# Patient Record
Sex: MALE | Race: BLACK OR AFRICAN AMERICAN | NOT HISPANIC OR LATINO | Employment: STUDENT | URBAN - METROPOLITAN AREA
[De-identification: names, ages, dates, MRNs, and addresses within clinical notes are randomized per-mention and may not be internally consistent; named-entity substitution may affect disease eponyms.]

---

## 2024-03-02 ENCOUNTER — HOSPITAL ENCOUNTER (EMERGENCY)
Facility: HOSPITAL | Age: 19
Discharge: HOME | End: 2024-03-02

## 2024-03-02 VITALS
SYSTOLIC BLOOD PRESSURE: 124 MMHG | HEART RATE: 72 BPM | RESPIRATION RATE: 18 BRPM | DIASTOLIC BLOOD PRESSURE: 78 MMHG | TEMPERATURE: 98.4 F | BODY MASS INDEX: 23.24 KG/M2 | HEIGHT: 71 IN | WEIGHT: 166 LBS | OXYGEN SATURATION: 98 %

## 2024-03-02 DIAGNOSIS — J45.901 MILD ASTHMA WITH EXACERBATION, UNSPECIFIED WHETHER PERSISTENT (HHS-HCC): Primary | ICD-10-CM

## 2024-03-02 PROCEDURE — 99283 EMERGENCY DEPT VISIT LOW MDM: CPT | Mod: 25

## 2024-03-02 PROCEDURE — 94640 AIRWAY INHALATION TREATMENT: CPT

## 2024-03-02 PROCEDURE — 2500000002 HC RX 250 W HCPCS SELF ADMINISTERED DRUGS (ALT 637 FOR MEDICARE OP, ALT 636 FOR OP/ED): Performed by: NURSE PRACTITIONER

## 2024-03-02 PROCEDURE — 2500000004 HC RX 250 GENERAL PHARMACY W/ HCPCS (ALT 636 FOR OP/ED): Performed by: NURSE PRACTITIONER

## 2024-03-02 RX ORDER — PREDNISONE 20 MG/1
40 TABLET ORAL DAILY
Qty: 8 TABLET | Refills: 0 | Status: SHIPPED | OUTPATIENT
Start: 2024-03-03 | End: 2024-03-07

## 2024-03-02 RX ORDER — PREDNISONE 20 MG/1
40 TABLET ORAL ONCE
Status: COMPLETED | OUTPATIENT
Start: 2024-03-02 | End: 2024-03-02

## 2024-03-02 RX ORDER — ALBUTEROL SULFATE 90 UG/1
2 AEROSOL, METERED RESPIRATORY (INHALATION) ONCE
Status: COMPLETED | OUTPATIENT
Start: 2024-03-02 | End: 2024-03-02

## 2024-03-02 RX ORDER — IPRATROPIUM BROMIDE AND ALBUTEROL SULFATE 2.5; .5 MG/3ML; MG/3ML
3 SOLUTION RESPIRATORY (INHALATION) ONCE
Status: COMPLETED | OUTPATIENT
Start: 2024-03-02 | End: 2024-03-02

## 2024-03-02 RX ADMIN — ALBUTEROL SULFATE 2 PUFF: 90 AEROSOL, METERED RESPIRATORY (INHALATION) at 08:58

## 2024-03-02 RX ADMIN — IPRATROPIUM BROMIDE AND ALBUTEROL SULFATE 3 ML: 2.5; .5 SOLUTION RESPIRATORY (INHALATION) at 08:40

## 2024-03-02 RX ADMIN — PREDNISONE 40 MG: 20 TABLET ORAL at 08:33

## 2024-03-02 ASSESSMENT — PAIN - FUNCTIONAL ASSESSMENT: PAIN_FUNCTIONAL_ASSESSMENT: 0-10

## 2024-03-02 ASSESSMENT — LIFESTYLE VARIABLES
HAVE PEOPLE ANNOYED YOU BY CRITICIZING YOUR DRINKING: NO
EVER HAD A DRINK FIRST THING IN THE MORNING TO STEADY YOUR NERVES TO GET RID OF A HANGOVER: NO
EVER FELT BAD OR GUILTY ABOUT YOUR DRINKING: NO
HAVE YOU EVER FELT YOU SHOULD CUT DOWN ON YOUR DRINKING: NO

## 2024-03-02 ASSESSMENT — PAIN SCALES - GENERAL
PAINLEVEL_OUTOF10: 0 - NO PAIN
PAINLEVEL_OUTOF10: 7
PAINLEVEL_OUTOF10: 4

## 2024-03-02 NOTE — ED PROVIDER NOTES
HPI   Chief Complaint   Patient presents with    ASTHMA EXACERBATION     Patient states he is home from college doesn't have his asthma medications hasn't slept all night is unable to breathe       18-year-old male presents emergency department, patient states history of asthma, states he recently came home from college and does not have his inhaler.  Describes cough and chest tightness related to his asthma.  No recent illness, fevers chills, body aches.  No known sick contacts.      History provided by:  Patient   used: No                        No data recorded                   Patient History   Past Medical History:   Diagnosis Date    Asthma      History reviewed. No pertinent surgical history.  No family history on file.  Social History     Tobacco Use    Smoking status: Every Day     Types: Cigarettes    Smokeless tobacco: Not on file   Substance Use Topics    Alcohol use: Yes    Drug use: Not Currently       Physical Exam   ED Triage Vitals [03/02/24 0812]   Temperature Heart Rate Respirations BP   36.9 °C (98.4 °F) 82 20 116/77      Pulse Ox Temp Source Heart Rate Source Patient Position   95 % Temporal -- --      BP Location FiO2 (%)     -- --       Physical Exam  Physical Exam:  Constitutional: Vitals noted, no distress. Afebrile.   Cardiovascular: Regular, rate, rhythm, no murmur.   Pulmonary: Lungs moderately diminished throughout with coarse wheezes appreciated.  No increased respiratory or effort or distress clear bilaterally with good aeration. No adventitious breath sounds.   Gastrointestinal: Soft, nonsurgical. Nontender. No peritoneal signs. Normoactive bowel sounds.   Musculoskeletal: No peripheral edema. Negative Homans bilaterally, no cords.   Skin: No rash.   Neuro: No focal neurologic deficits, NIH score of 0.    ED Course & MDM   Diagnoses as of 03/02/24 0826   Mild asthma with exacerbation, unspecified whether persistent       Medical Decision Making  Will treat asthma  exacerbation, given oral prednisone, DuoNeb treatment via nebulizer and dispensed an albuterol inhaler.    On reevaluation aeration is improved, patient is feeling improved.    Will continue course of prednisone for the patient.  Again he was dispensed an albuterol inhaler here.  Patient should monitor respiratory status, return with any worsening symptoms or additional concerns.    Procedure  Procedures     ISABEL Palacios  03/02/24 0824       ISABEL Palacios  03/02/24 0826

## 2025-02-06 ENCOUNTER — HOSPITAL ENCOUNTER (EMERGENCY)
Facility: HOSPITAL | Age: 20
Discharge: HOME | End: 2025-02-06

## 2025-02-06 VITALS
SYSTOLIC BLOOD PRESSURE: 135 MMHG | WEIGHT: 185 LBS | OXYGEN SATURATION: 99 % | RESPIRATION RATE: 16 BRPM | TEMPERATURE: 99 F | HEIGHT: 71 IN | BODY MASS INDEX: 25.9 KG/M2 | HEART RATE: 82 BPM | DIASTOLIC BLOOD PRESSURE: 79 MMHG

## 2025-02-06 DIAGNOSIS — H66.90 ACUTE OTITIS MEDIA, UNSPECIFIED OTITIS MEDIA TYPE: Primary | ICD-10-CM

## 2025-02-06 LAB — S PYO DNA THROAT QL NAA+PROBE: NOT DETECTED

## 2025-02-06 PROCEDURE — 99283 EMERGENCY DEPT VISIT LOW MDM: CPT

## 2025-02-06 PROCEDURE — 87651 STREP A DNA AMP PROBE: CPT | Performed by: PHYSICIAN ASSISTANT

## 2025-02-06 PROCEDURE — 2500000001 HC RX 250 WO HCPCS SELF ADMINISTERED DRUGS (ALT 637 FOR MEDICARE OP): Performed by: PHYSICIAN ASSISTANT

## 2025-02-06 RX ORDER — IBUPROFEN 400 MG/1
400 TABLET ORAL ONCE
Status: COMPLETED | OUTPATIENT
Start: 2025-02-06 | End: 2025-02-06

## 2025-02-06 RX ORDER — ACETAMINOPHEN 500 MG
1000 TABLET ORAL EVERY 6 HOURS PRN
Qty: 40 TABLET | Refills: 0 | Status: SHIPPED | OUTPATIENT
Start: 2025-02-06 | End: 2025-02-11

## 2025-02-06 RX ORDER — IBUPROFEN 600 MG/1
TABLET ORAL
Qty: 20 TABLET | Refills: 0 | Status: SHIPPED | OUTPATIENT
Start: 2025-02-06

## 2025-02-06 RX ORDER — AMOXICILLIN 500 MG/1
1000 TABLET, FILM COATED ORAL 3 TIMES DAILY
Qty: 60 TABLET | Refills: 0 | Status: SHIPPED | OUTPATIENT
Start: 2025-02-06 | End: 2025-02-16

## 2025-02-06 RX ORDER — ACETAMINOPHEN 325 MG/1
975 TABLET ORAL ONCE
Status: COMPLETED | OUTPATIENT
Start: 2025-02-06 | End: 2025-02-06

## 2025-02-06 RX ADMIN — ACETAMINOPHEN 975 MG: 325 TABLET ORAL at 14:39

## 2025-02-06 RX ADMIN — IBUPROFEN 400 MG: 400 TABLET, FILM COATED ORAL at 14:40

## 2025-02-06 ASSESSMENT — COLUMBIA-SUICIDE SEVERITY RATING SCALE - C-SSRS
1. IN THE PAST MONTH, HAVE YOU WISHED YOU WERE DEAD OR WISHED YOU COULD GO TO SLEEP AND NOT WAKE UP?: NO
2. HAVE YOU ACTUALLY HAD ANY THOUGHTS OF KILLING YOURSELF?: NO
6. HAVE YOU EVER DONE ANYTHING, STARTED TO DO ANYTHING, OR PREPARED TO DO ANYTHING TO END YOUR LIFE?: NO

## 2025-02-06 ASSESSMENT — PAIN SCALES - GENERAL: PAINLEVEL_OUTOF10: 7

## 2025-02-06 ASSESSMENT — PAIN - FUNCTIONAL ASSESSMENT: PAIN_FUNCTIONAL_ASSESSMENT: 0-10

## 2025-02-06 NOTE — ED PROVIDER NOTES
"HPI   Chief Complaint   Patient presents with    Facial Pain     'Here for left facial pain that has been going on for 72 hours.\" No injury or falls.       19-year-old male presents emergency department for left-sided ear, jaw and throat pain.  Symptoms have been going on for a couple of days.  States every time he swallows, his pain is worse.  He denies fevers or flulike symptoms, abdominal pain, vomiting.              Patient History   Past Medical History:   Diagnosis Date    Asthma      History reviewed. No pertinent surgical history.  No family history on file.  Social History     Tobacco Use    Smoking status: Former     Types: Cigarettes    Smokeless tobacco: Never   Vaping Use    Vaping status: Every Day   Substance Use Topics    Alcohol use: Yes    Drug use: Yes     Types: Marijuana       Physical Exam   ED Triage Vitals [02/06/25 1355]   Temperature Heart Rate Respirations BP   37.2 °C (99 °F) 82 16 135/79      Pulse Ox Temp Source Heart Rate Source Patient Position   99 % Temporal Monitor Sitting      BP Location FiO2 (%)     Right arm --       Physical Exam  Vitals and nursing note reviewed.   Constitutional:       General: He is not in acute distress.  HENT:      Head: Atraumatic.      Right Ear: Tympanic membrane normal.      Ears:      Comments: Erythematous, bulging left tympanic membrane consistent with otitis media     Mouth/Throat:      Mouth: Mucous membranes are moist.      Comments: Erythematous pharynx with mild equal enlargement of the tonsils bilaterally and faint soft palate petechiae.  Eyes:      Extraocular Movements: Extraocular movements intact.      Conjunctiva/sclera: Conjunctivae normal.      Pupils: Pupils are equal, round, and reactive to light.   Cardiovascular:      Rate and Rhythm: Normal rate and regular rhythm.      Pulses: Normal pulses.   Pulmonary:      Effort: Pulmonary effort is normal. No respiratory distress.      Breath sounds: Normal breath sounds.   Abdominal:      " General: There is no distension.      Palpations: Abdomen is soft.      Tenderness: There is no abdominal tenderness. There is no guarding or rebound.   Musculoskeletal:         General: No deformity.      Cervical back: Neck supple.   Lymphadenopathy:      Cervical: Cervical adenopathy present.   Skin:     General: Skin is warm and dry.   Neurological:      Mental Status: He is alert and oriented to person, place, and time. Mental status is at baseline.      Cranial Nerves: No cranial nerve deficit.      Sensory: No sensory deficit.      Motor: No weakness.   Psychiatric:         Mood and Affect: Mood normal.         Behavior: Behavior normal.           ED Course & MDM   Diagnoses as of 02/06/25 1431   Acute otitis media, unspecified otitis media type                 No data recorded                                 Medical Decision Making  19-year-old male presents emergency department for sore throat and left ear pain.  On my exam, he has an erythematous pharynx with soft palate petechiae and anterior cervical lymphadenopathy.  I suspect strep but he also has an erythematous, bulging left tympanic membrane consistent with otitis media.  He opts to have the swab but go home before the results, as I am going to be treating him with antibiotics either way.  He will check his chart for the results.  I prescribed the patient amoxicillin, ibuprofen and Tylenol.  He was given Tylenol and ibuprofen here for pain.  Discussed results with patient and/or family/friend and recommended close follow up with primary care or specialist.  Reviewed return precautions at length.  I answered all questions.           Procedure  Procedures     Kalli Stevens PA-C  02/06/25 1434

## 2025-02-06 NOTE — Clinical Note
Luther Valenzuela was seen and treated in our emergency department on 2/6/2025.  He may return to work on 02/10/2025.       If you have any questions or concerns, please don't hesitate to call.      Kalli Stevens PA-C